# Patient Record
Sex: MALE | Race: WHITE | NOT HISPANIC OR LATINO | Employment: OTHER | URBAN - METROPOLITAN AREA
[De-identification: names, ages, dates, MRNs, and addresses within clinical notes are randomized per-mention and may not be internally consistent; named-entity substitution may affect disease eponyms.]

---

## 2024-04-15 ENCOUNTER — MEDICAL CORRESPONDENCE (OUTPATIENT)
Dept: HEALTH INFORMATION MANAGEMENT | Facility: CLINIC | Age: 54
End: 2024-04-15
Payer: COMMERCIAL

## 2024-04-15 ENCOUNTER — TRANSFERRED RECORDS (OUTPATIENT)
Dept: HEALTH INFORMATION MANAGEMENT | Facility: CLINIC | Age: 54
End: 2024-04-15
Payer: COMMERCIAL

## 2024-04-17 ENCOUNTER — TRANSCRIBE ORDERS (OUTPATIENT)
Dept: OTHER | Age: 54
End: 2024-04-17

## 2024-04-17 DIAGNOSIS — R91.8 LUNG MASS: Primary | ICD-10-CM

## 2024-04-18 ENCOUNTER — PRE VISIT (OUTPATIENT)
Dept: ONCOLOGY | Facility: CLINIC | Age: 54
End: 2024-04-18
Payer: COMMERCIAL

## 2024-04-18 ENCOUNTER — PATIENT OUTREACH (OUTPATIENT)
Dept: ONCOLOGY | Facility: CLINIC | Age: 54
End: 2024-04-18
Payer: COMMERCIAL

## 2024-04-18 DIAGNOSIS — J98.59 MEDIASTINAL MASS: Primary | ICD-10-CM

## 2024-04-18 NOTE — PROGRESS NOTES
New IP (Interventional Pulmonology) referral rec'd.  Chart reviewed.       New Patient: Interventional Pulmonary (Lung nodule) Nurse Navigator Note    Referring provider: Referred by: Dr. Agustin Victoria @ [facility]  Ph: [phone] Fx: [phone]    Referred to (specialty): Interventional Pulmonary (Lung nodule)    Requested provider (if applicable): n/a    Date Referral Received: 4/17/2024    Evaluation for :  Lung nodule    Clinical History (per Nurse review of records provided):    **BOOK MARKED**    Records Location: [facility]     RECORDS NEEDED: 4/15/2024 CT Chest imaging & any other chest imaging (don't see any)---pushed to PACS from [facility]----thank you!!    Additional testing needed prior to consult: PFT's

## 2024-04-18 NOTE — TELEPHONE ENCOUNTER
Imaging DISC Received  2024 4:20 PM ABT   Action: Imaging disc from [facility] received and taken to for upload.     Records Requested     2024 10:36 AM  [place] [address]   Facility  [facility]   Outcome I called the [facility] Dept Phone: [phone]- they recommended I call [facility]     Radiology Dept/[facility] (separate)  Ph: [phone]  Fax: [phone]    Mailing Address:   [facility] [address] [place] [address]    [employer] Tracking Number:   [id number]    They confirmed the pt only had on CT Chest scan done at their facility    1:11pm KEB   I called to make sure they got my request for imaging. They put me on hold to double check on the fax. They haven't received the request yet. I sent over another fax.      RECORDS STATUS - ALL OTHER DIAGNOSIS      RECORDS RECEIVED FROM:    Appt Date:  W   NOTES STATUS DETAILS   [facility]/15[facility]: Dr. Agustin Victoria   MEDICATION LIST [facility]    LABS     PATHOLOGY REPORTS     ANYTHING RELATED TO DIAGNOSIS [facility] Mostrecent 04/15/24   IMAGING (NEED IMAGES & REPORT)     CT SCANS PACS [facility]:  04/15/24: CTA Chest   [employer] TRACKING   TRACKING #: [id number]  2nd [employer] Trackin

## 2024-04-19 PROBLEM — M25.569 KNEE PAIN: Status: ACTIVE | Noted: 2022-05-02

## 2024-04-19 PROBLEM — M17.9 DJD (DEGENERATIVE JOINT DISEASE) OF KNEE: Status: ACTIVE | Noted: 2023-09-17

## 2024-04-19 PROBLEM — R91.8 LUNG MASS: Status: ACTIVE | Noted: 2024-04-15

## 2024-04-19 PROBLEM — M17.12 ARTHRITIS OF LEFT KNEE: Status: ACTIVE | Noted: 2023-09-26

## 2024-04-19 PROBLEM — M17.12 OSTEOARTHRITIS OF LEFT KNEE: Status: ACTIVE | Noted: 2021-05-03

## 2024-04-19 PROBLEM — E66.9 OBESITY: Status: ACTIVE | Noted: 2024-04-19

## 2024-04-19 PROBLEM — R07.9 CHEST PAIN: Status: ACTIVE | Noted: 2024-04-15

## 2024-04-19 PROBLEM — F32.A DEPRESSIVE DISORDER: Status: ACTIVE | Noted: 2024-04-19

## 2024-04-19 PROBLEM — F32.A DEPRESSION: Status: ACTIVE | Noted: 2024-04-19

## 2024-04-19 PROBLEM — M25.512 LEFT SHOULDER PAIN: Status: ACTIVE | Noted: 2022-06-10

## 2024-04-19 PROBLEM — M23.307 DEGENERATIVE TEAR OF MENISCUS OF LEFT KNEE: Status: ACTIVE | Noted: 2023-09-26

## 2024-04-19 PROBLEM — B07.0 PLANTAR WARTS: Status: ACTIVE | Noted: 2021-05-03

## 2024-04-19 PROBLEM — L25.9 CONTACT DERMATITIS: Status: ACTIVE | Noted: 2024-04-15

## 2024-04-19 PROBLEM — I10 ESSENTIAL HYPERTENSION: Status: ACTIVE | Noted: 2021-05-03

## 2024-04-19 PROBLEM — R79.89 ELEVATED D-DIMER: Status: ACTIVE | Noted: 2024-04-15

## 2024-04-19 RX ORDER — CALCIUM CARBONATE 500 MG/1
500 TABLET, CHEWABLE ORAL DAILY
COMMUNITY

## 2024-04-19 RX ORDER — TRIAMCINOLONE ACETONIDE 1 MG/G
CREAM TOPICAL
COMMUNITY
Start: 2024-04-15 | End: 2025-04-15

## 2024-04-19 RX ORDER — AMLODIPINE AND BENAZEPRIL HYDROCHLORIDE 5; 20 MG/1; MG/1
CAPSULE ORAL
COMMUNITY
Start: 2022-06-03

## 2024-04-19 RX ORDER — AMLODIPINE AND BENAZEPRIL HYDROCHLORIDE 5; 40 MG/1; MG/1
CAPSULE ORAL
COMMUNITY
Start: 2023-08-28 | End: 2024-04-26

## 2024-04-19 RX ORDER — AMLODIPINE AND BENAZEPRIL HYDROCHLORIDE 5; 10 MG/1; MG/1
CAPSULE ORAL
COMMUNITY
Start: 2022-06-01 | End: 2024-04-26

## 2024-04-22 NOTE — PROGRESS NOTES
"I called and spoke to Scott at length.  I let him know that Dr. Veloz feels it would be better for him to see a thoracic surgeon based on the location of his \"spot.\" (Anterior mediastinum lesion)    I explained and answered many questions that he had.  I am able to reschedule him from this afternoon to tomorrow afternoon.    I messaged medical records as the CT imaging is not in PACS and they assured me that the disk is due to arrive this morning.    I warm transferred Scott to scheduling.   "

## 2024-04-22 NOTE — PROGRESS NOTES
THORACIC SURGERY - NEW PATIENT OFFICE VISIT      Dear Dr. Victoria,    I saw Scott Bolton at [facility]'s request in consultation for the evaluation and treatment of a nodule of the superior mediastinum.    HPI  Scott Bolton is a 53 year old male patient who presents with a newly discovered superior mediastinal mass. He had chest pain and presented to the emergency department and the workup revealed the mass. He has gained some clint after having his left knee replaced over the winter. His energy levels are good. He has no significant bone pain.   His father  of metastatic head and neck cancer. His brother  of brain cancer. His mother  of possibly lung cancer.     ECOG performance status  0- Fully active, without restriction                 Previsit Tests   CT scan (04/15/2024): superior mediastinal mass 3.2 x 3.1 x 3.0 cm      PMH  Reviewed, as below    Contact dermatitis  Depression  Hypertension  Obesity  Osteoarthritis of left knee      PSH  Reviewed, as below    Left knee replacement  Bilateral inguinal hernia repair as a child  Colonoscopy    No Known Allergies    Current Outpatient Medications   Medication Sig Dispense Refill    amLODIPine-benazepril (LOTREL) 5-10 MG capsule See Instructions, Instructions: Due for OV with PCP.  TAKE ONE CAPSULE BY MOUTH EVERY DAY, # 30 cap(s), 0 Refill(s), Type: Maintenance, Pharmacy: [facility], Due for OV with PCP.  TAKE ONE CAPSULE BY MOUTH EVERY DAY, 71.5, in,...      amLODIPine-benazepril (LOTREL) 5-20 MG capsule 1 cap(s), Oral, daily, # 90 cap(s), 3 Refill(s), Type: Maintenance, Pharmacy: [facility] #[id number], 1 cap(s) Oral daily, 254, lb, 04/15/24 10:38:00 CDT, Weight Measured      amLODIPine-benazepril (LOTREL) 5-40 MG capsule 1 cap(s), Oral, daily, # 90 cap(s), 3 Refill(s), Type: Maintenance, Pharmacy: [facility] #[id number], 1 cap(s) Oral daily,x90 day(s), 239, lb, 23 10:19:00 CDT, Weight Measured      calcium carbonate  "(TUMS) 500 MG chewable tablet Take 500 mg by mouth daily      triamcinolone (KENALOG) 0.1 % external cream 1 aicha, Topical, tid, # 454 gm, 0 Refill(s), Type: Acute, Pharmacy: [facility] #[id number], 1 aicha Topical tid, 254, lb, 04/15/24 10:38:00 CDT, Weight Measured       No current facility-administered medications for this visit.       ETOH: None  TOBACCO:  Never smoker.  Ages 12 - 45 chewing tobacco.  1 tin daily  OTHER DRUGS: Marijuana smoking daily or gummies    Physical examination  BP (!) 144/88   Pulse 85   Temp 98.2  F (36.8  C) (Oral)   Resp 18   Ht 1.792 m (5' 10.57\")   Wt 113.9 kg (251 lb)   SpO2 96%   BMI 35.43 kg/m     Physical Exam  Constitutional:       Appearance: Normal appearance. He is normal weight.   Eyes:      Conjunctiva/sclera: Conjunctivae normal.   Cardiovascular:      Rate and Rhythm: Normal rate.   Pulmonary:      Effort: Pulmonary effort is normal.   Skin:     General: Skin is warm and dry.   Neurological:      Mental Status: He is alert and oriented to person, place, and time.   Psychiatric:         Mood and Affect: Mood normal.         Behavior: Behavior normal.         Thought Content: Thought content normal.         Judgment: Judgment normal.          From a personal perspective, he is here with his wife. 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      IMPRESSION (J98.59) Mediastinal mass     53 year old male patient with a superior mediastinal mass.     PLAN  I spent 30 min on the date of the encounter in chart review, patient visit, review of tests, documentation and/or discussion with other providers about the issues documented above. I reviewed the plan as follows:  Dr. Veloz will schedule Mr. Bolton for an endobronchial ultrasound-guided biopsy of the superior mediastinal mass. Further management will be determined pending the biopsy results.    I appreciate the opportunity to participate in the care of your patient and will keep you updated.  Sincerely,    Gab" MD Alessia

## 2024-04-23 ENCOUNTER — ONCOLOGY VISIT (OUTPATIENT)
Dept: SURGERY | Facility: CLINIC | Age: 54
End: 2024-04-23
Attending: FAMILY MEDICINE
Payer: COMMERCIAL

## 2024-04-23 VITALS
TEMPERATURE: 98.2 F | HEART RATE: 85 BPM | OXYGEN SATURATION: 96 % | HEIGHT: 71 IN | WEIGHT: 251 LBS | DIASTOLIC BLOOD PRESSURE: 88 MMHG | SYSTOLIC BLOOD PRESSURE: 144 MMHG | BODY MASS INDEX: 35.14 KG/M2 | RESPIRATION RATE: 18 BRPM

## 2024-04-23 DIAGNOSIS — J98.59 MEDIASTINAL MASS: ICD-10-CM

## 2024-04-23 PROCEDURE — 99213 OFFICE O/P EST LOW 20 MIN: CPT | Performed by: THORACIC SURGERY (CARDIOTHORACIC VASCULAR SURGERY)

## 2024-04-23 PROCEDURE — 99203 OFFICE O/P NEW LOW 30 MIN: CPT | Performed by: THORACIC SURGERY (CARDIOTHORACIC VASCULAR SURGERY)

## 2024-04-23 ASSESSMENT — PAIN SCALES - GENERAL: PAINLEVEL: NO PAIN (0)

## 2024-04-23 NOTE — NURSING NOTE
"Oncology Rooming Note    April 23, 2024 5:00 PM   Scott Bolton is a 53 year old male who presents for:    Chief Complaint   Patient presents with    Oncology Clinic Visit     Lung Mass     Initial Vitals: BP (!) 144/88   Pulse 85   Temp 98.2  F (36.8  C) (Oral)   Resp 18   Ht 1.792 m (5' 10.57\")   Wt 113.9 kg (251 lb)   SpO2 96%   BMI 35.43 kg/m   Estimated body mass index is 35.43 kg/m  as calculated from the following:    Height as of this encounter: 1.792 m (5' 10.57\").    Weight as of this encounter: 113.9 kg (251 lb). Body surface area is 2.38 meters squared.  No Pain (0) Comment: Data Unavailable   No LMP for male patient.  Allergies reviewed: Yes  Medications reviewed: Yes    Medications: Medication refills not needed today.  Pharmacy name entered into [employer]: [facility] #[id number] - [place] [address] [facility] [address]     Frailty Screening:   Is the patient here for a new oncology consult visit in cancer care? 1. Yes. Over the past month, have you experienced difficulty or required a caregiver to assist with:   1. Balance, walking or general mobility (including any falls)? NO  2. Completion of self-care tasks such as bathing, dressing, toileting, grooming/hygiene?  NO  3. Concentration or memory that affects your daily life?  NO       Clinical concerns:        Graciela Tate CMA              "

## 2024-04-23 NOTE — LETTER
2024         RE: Scott Bolton  [address]  [place] [address]        Dear Colleague,    Thank you for referring your patient, Scott Bolton, to the [facility]. Please see a copy of my visit note below.    THORACIC SURGERY - NEW PATIENT OFFICE VISIT      Dear Dr. Victoria,    I saw Scott Bolton at Magd[facility]'s request in consultation for the evaluation and treatment of a nodule of the superior mediastinum.    HPI  Scott Bolton is a 53 year old male patient who presents with a newly discovered superior mediastinal mass. He had chest pain and presented to the emergency department and the workup revealed the mass. He has gained some clint after having his left knee replaced over the winter. His energy levels are good. He has no significant bone pain.   His father  of metastatic head and neck cancer. His brother  of brain cancer. His mother  of possibly lung cancer.     ECOG performance status  0- Fully active, without restriction                 Previsit Tests   CT scan (04/15/2024): superior mediastinal mass 3.2 x 3.1 x 3.0 cm      PMH  Reviewed, as below    Contact dermatitis  Depression  Hypertension  Obesity  Osteoarthritis of left knee      PSH  Reviewed, as below    Left knee replacement  Bilateral inguinal hernia repair as a child  Colonoscopy    No Known Allergies    Current Outpatient Medications   Medication Sig Dispense Refill    amLODIPine-benazepril (LOTREL) 5-10 MG capsule See Instructions, Instructions: Due for OV with PCP.  TAKE ONE CAPSULE BY MOUTH EVERY DAY, # 30 cap(s), 0 Refill(s), Type: Maintenance, Pharmacy: [facility], Due for OV with PCP.  TAKE ONE CAPSULE BY MOUTH EVERY DAY, 71.5, in,...      amLODIPine-benazepril (LOTREL) 5-20 MG capsule 1 cap(s), Oral, daily, # 90 cap(s), 3 Refill(s), Type: Maintenance, Pharmacy: [facility] #[id number], 1 cap(s) Oral daily, 254, lb, 04/15/24 10:38:00 CDT, Weight Measured       "amLODIPine-benazepril (LOTREL) 5-40 MG capsule 1 cap(s), Oral, daily, # 90 cap(s), 3 Refill(s), Type: Maintenance, Pharmacy: [facility] #[id number], 1 cap(s) Oral daily,x90 day(s), 239, lb, 08/28/23 10:19:00 CDT, Weight Measured      calcium carbonate (TUMS) 500 MG chewable tablet Take 500 mg by mouth daily      triamcinolone (KENALOG) 0.1 % external cream 1 aicha, Topical, tid, # 454 gm, 0 Refill(s), Type: Acute, Pharmacy: [facility] #[id number], 1 aicha Topical tid, 254, lb, 04/15/24 10:38:00 CDT, Weight Measured       No current facility-administered medications for this visit.       ETOH: None  TOBACCO:  Never smoker.  Ages 12 - 45 chewing tobacco.  1 tin daily  OTHER DRUGS: Marijuana smoking daily or gummies    Physical examination  BP (!) 144/88   Pulse 85   Temp 98.2  F (36.8  C) (Oral)   Resp 18   Ht 1.792 m (5' 10.57\")   Wt 113.9 kg (251 lb)   SpO2 96%   BMI 35.43 kg/m     Physical Exam  Constitutional:       Appearance: Normal appearance. He is normal weight.   Eyes:      Conjunctiva/sclera: Conjunctivae normal.   Cardiovascular:      Rate and Rhythm: Normal rate.   Pulmonary:      Effort: Pulmonary effort is normal.   Skin:     General: Skin is warm and dry.   Neurological:      Mental Status: He is alert and oriented to person, place, and time.   Psychiatric:         Mood and Affect: Mood normal.         Behavior: Behavior normal.         Thought Content: Thought content normal.         Judgment: Judgment normal.          From a personal perspective, he is here with his wife. 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      IMPRESSION (J98.59) Mediastinal mass     53 year old male patient with a superior mediastinal mass.     PLAN  I spent 30 min on the date of the encounter in chart review, patient visit, review of tests, documentation and/or discussion with other providers about the issues documented above. I reviewed the plan as follows:  Dr. Veloz will schedule Mr. Bolton for an " endobronchial ultrasound-guided biopsy of the superior mediastinal mass. Further management will be determined pending the biopsy results.    I appreciate the opportunity to participate in the care of your patient and will keep you updated.  Sincerely,    Gab Aggarwal MD

## 2024-04-25 ENCOUNTER — PREP FOR PROCEDURE (OUTPATIENT)
Dept: MULTI SPECIALTY CLINIC | Facility: CLINIC | Age: 54
End: 2024-04-25
Payer: COMMERCIAL

## 2024-04-25 DIAGNOSIS — J98.59 MEDIASTINAL MASS: Primary | ICD-10-CM

## 2024-04-25 RX ORDER — LIDOCAINE 40 MG/G
CREAM TOPICAL
Status: CANCELLED | OUTPATIENT
Start: 2024-04-25

## 2024-04-26 RX ORDER — ASPIRIN 81 MG/1
TABLET, COATED ORAL
COMMUNITY
Start: 2024-01-02

## 2024-05-02 ENCOUNTER — PATIENT OUTREACH (OUTPATIENT)
Dept: ONCOLOGY | Facility: CLINIC | Age: 54
End: 2024-05-02
Payer: COMMERCIAL

## 2024-05-02 NOTE — PROGRESS NOTES
Scott called today.  I had spoken to him a couple weeks ago when I rec'd an IP (Interventional Pulmonology) referral for him.  At that time I had Dr. Veloz (on-call for IP (Interventional Pulmonology) that day) review the chart/image to see if this is something IP would see and he lida to have Thoracic surgery see patient as it was an anterior mediastinal mass.    4/23/2024:  Pt saw Dr. Aggarwal who in the end referred him back to Dr. Veloz.  Per Dr. Aggarwal's note:  Dr. Veloz will schedule Mr. Bolton for an endobronchial ultrasound-guided biopsy of the superior mediastinal mass. Further management will be determined pending the biopsy results.     I checked and DR. Veloz has placed a CR but we are waiting for an OR time to become available.  I called and left Scott this information and Mark Anthony's phone number ().    Scott has my number for any further questions.

## 2024-05-06 ENCOUNTER — TELEPHONE (OUTPATIENT)
Dept: PULMONOLOGY | Facility: CLINIC | Age: 54
End: 2024-05-06
Payer: COMMERCIAL

## 2024-05-06 ENCOUNTER — CARE COORDINATION (OUTPATIENT)
Dept: PULMONOLOGY | Facility: CLINIC | Age: 54
End: 2024-05-06
Payer: COMMERCIAL

## 2024-05-06 NOTE — TELEPHONE ENCOUNTER
Writer contacted patient to schedule GI procedure. Scheduled 05/13 with Dr. Veloz. Packet information sent via email provided by patient [email or web address].    Mark Anthony Valdivia on 5/6/2024 at 10:40 AM

## 2024-05-06 NOTE — PROGRESS NOTES
Preoperative instructions sent via secure email to patient's verified email in chart ([email or web address]) and via [employer] mail per patient's request as they do not have [employer] access.     The following is what was sent via email/mail:    Sumanth Chavez,    Below are the following instructions you will need for your upcoming procedure. Read through these and let me know if you have any further questions.     Please hold the following medication(s) prior to your procedure:  Aspirin  -     Your Surgery Day   Your surgery is on: May 13th, 2024  ARRIVAL TIME: 10:00 am  Surgery Time: 11:00 am      Surgery Location:      [facility]      Address:      [address] [place] [address]      Day of surgery: [phone]      Other questions: [phone]      Fax test results to: [phone]    Check-in:   You may use our "[employer]" parking at the main entrance  Check in at the welcome desk in the main lobby and you will be directed to where you need to go from there.    Important phone numbers:  [phone]    Please remember that surgery times are subject to change. You will be notified if your surgery time changes.      Preparing for surgery    Call your clinic if there's any change in your health. This includes signs of a cold or flu (sore throat, runny nose, cough, rash, fever). It also includes a scrape or scratch near the surgery site. If you have questions on the day of surgery, call your hospital or surgery center.     Eating and drinking guidelines  For your safety: Unless your surgeon tells you otherwise, follow the guidelines below.  Eat and drink as usual until 8 hours before you arrive for surgery. After that, no food or milk.  Drink clear liquids until 2 hours before you arrive. These are liquids you can see through, like water, Gatorade, and Propel Water. They also include plain black coffee and tea (no cream  or milk), candy, and breath mints. You can spit out gum when you arrive.  If you drink alcohol: Stop drinking it the night before surgery.  If your care team tells you to take medicine on the morning of surgery, it's okay to take it with a sip of water.    Preventing infection  Shower or bathe the morning of your surgery.   Don't smoke or vape the morning of surgery. You may chew nicotine gum up to 2 hours before surgery. A nicotine patch is okay.  Note: Some surgeries require you to completely quit smoking and nicotine. Check with your surgeon.    Your care team will make every effort to keep you safe from infection. We will:  Clean our hands often with soap and water (or an alcohol-based hand rub).  Clean the skin at your surgery site with a special soap that kills germs.  Give you a special gown to keep you warm. (Cold raises the risk of infection.)  Wear special hair covers, masks, gowns, and gloves during surgery.     What to bring on the day of surgery  Photo ID and insurance card  Glasses and hearing aids (bring cases) you can't wear contacts during surgery    Please remove any jewelry, including body piercings. Leave jewelry and other valuables at home.     If you're going home the day of surgery  You must have a responsible adult drive you home. They should stay with you overnight as well.  If you don't have someone to stay with you, and you aren't safe to go home alone, we may keep you overnight. Insurance often won't pay for this.

## 2024-05-13 ENCOUNTER — HOSPITAL ENCOUNTER (OUTPATIENT)
Facility: CLINIC | Age: 54
Discharge: HOME | End: 2024-05-13
Attending: INTERNAL MEDICINE | Admitting: INTERNAL MEDICINE
Payer: COMMERCIAL

## 2024-05-13 VITALS
SYSTOLIC BLOOD PRESSURE: 164 MMHG | HEART RATE: 111 BPM | RESPIRATION RATE: 16 BRPM | OXYGEN SATURATION: 5 % | DIASTOLIC BLOOD PRESSURE: 109 MMHG

## 2024-05-13 PROCEDURE — 88342 IMHCHEM/IMCYTCHM 1ST ANTB: CPT | Mod: 26 | Performed by: PATHOLOGY

## 2024-05-13 PROCEDURE — 88305 TISSUE EXAM BY PATHOLOGIST: CPT | Mod: 26 | Performed by: PATHOLOGY

## 2024-05-13 PROCEDURE — 99153 MOD SED SAME PHYS/QHP EA: CPT | Performed by: INTERNAL MEDICINE

## 2024-05-13 PROCEDURE — 31652 BRONCH EBUS SAMPLNG 1/2 NODE: CPT | Performed by: INTERNAL MEDICINE

## 2024-05-13 PROCEDURE — 88173 CYTOPATH EVAL FNA REPORT: CPT | Mod: 26 | Performed by: PATHOLOGY

## 2024-05-13 PROCEDURE — 88342 IMHCHEM/IMCYTCHM 1ST ANTB: CPT | Mod: TC | Performed by: INTERNAL MEDICINE

## 2024-05-13 PROCEDURE — 250N000009 HC RX 250: Performed by: INTERNAL MEDICINE

## 2024-05-13 PROCEDURE — 250N000011 HC RX IP 250 OP 636: Performed by: INTERNAL MEDICINE

## 2024-05-13 PROCEDURE — 99152 MOD SED SAME PHYS/QHP 5/>YRS: CPT | Performed by: INTERNAL MEDICINE

## 2024-05-13 PROCEDURE — 88172 CYTP DX EVAL FNA 1ST EA SITE: CPT | Mod: 26 | Performed by: PATHOLOGY

## 2024-05-13 PROCEDURE — 88341 IMHCHEM/IMCYTCHM EA ADD ANTB: CPT | Mod: 26 | Performed by: PATHOLOGY

## 2024-05-13 PROCEDURE — G0500 MOD SEDAT ENDO SERVICE >5YRS: HCPCS | Performed by: INTERNAL MEDICINE

## 2024-05-13 RX ORDER — LIDOCAINE HYDROCHLORIDE 40 MG/ML
INJECTION, SOLUTION RETROBULBAR PRN
Status: DISCONTINUED | OUTPATIENT
Start: 2024-05-13 | End: 2024-05-13 | Stop reason: HOSPADM

## 2024-05-13 RX ORDER — FENTANYL CITRATE 50 UG/ML
INJECTION, SOLUTION INTRAMUSCULAR; INTRAVENOUS PRN
Status: DISCONTINUED | OUTPATIENT
Start: 2024-05-13 | End: 2024-05-13 | Stop reason: HOSPADM

## 2024-05-13 RX ORDER — DIPHENHYDRAMINE HYDROCHLORIDE 50 MG/ML
INJECTION INTRAMUSCULAR; INTRAVENOUS PRN
Status: DISCONTINUED | OUTPATIENT
Start: 2024-05-13 | End: 2024-05-13 | Stop reason: HOSPADM

## 2024-05-13 RX ORDER — LIDOCAINE HYDROCHLORIDE 10 MG/ML
INJECTION, SOLUTION INFILTRATION; PERINEURAL PRN
Status: DISCONTINUED | OUTPATIENT
Start: 2024-05-13 | End: 2024-05-13 | Stop reason: HOSPADM

## 2024-05-13 RX ORDER — LIDOCAINE 40 MG/G
CREAM TOPICAL
Status: DISCONTINUED | OUTPATIENT
Start: 2024-05-13 | End: 2024-05-13 | Stop reason: HOSPADM

## 2024-05-13 ASSESSMENT — ACTIVITIES OF DAILY LIVING (ADL)
ADLS_ACUITY_SCORE: 35

## 2024-05-13 NOTE — BRIEF OP NOTE
[facility]    Brief Operative Note    Pre-operative diagnosis: Mediastinal mass [J98.59]  Post-operative diagnosis Same as pre-operative diagnosis    Procedure: BRONCHOSCOPY, WITH BIOPSY OF 1 OR 2 LYMPH NODE STATIONS WITH ENDOBRONCHIAL ULTRASOUND GUIDANCE, N/A - Bronchus    Surgeon: Surgeons and Role:     * Esa Veloz MD - Primary  Anesthesia: Moderate Sedation   Estimated Blood Loss: None    Drains: None  Specimens:   ID Type Source Tests Collected by Time Destination   1 : anterior mediastinal mass Fine Needle Aspiration Lung, Left FINE NEEDLE ASPIRATE Esa Veloz MD 5/13/2024 12:18 PM      Findings:   Mass identified .  Complications: None.  Implants: * No implants in log *

## 2024-05-13 NOTE — OR NURSING
Procedure: EBUS  Sedation: conscious sedation  Specimens: verified, sent to lab with path staff  O2: ra  Tolerated procedure: well  Pt to recovery area in stable condition accompanied by RN, bedside report given to recovery RN  Other:  n/a    All belongings with patient at time of transfer.

## 2024-05-13 NOTE — DISCHARGE INSTRUCTIONS
Post Bronchoscopy Patient Instructions:    May 13, 2024  Scott Bolton    Your procedure was completed (bronchoscopy with biopsy) without any immediate complications.    You may cough up scant amount of blood for the next 12-24 hours. If you have excessive cough with blood, chest pain, shortness of breath, please report to the closest emergency room.    You may experience low grade (less than 100.5 F) fever next 24 hours, if so, you can take Tylenol. If the fever persists more than 24 hours, please contact to our office or your primary care provider.    Our office (Thoracic/Pulmonary--[phone]) will call you with the results of any samples taken during the procedure. Please note that you may get a result notification through  My Chart  before us calling you as the Laboratories are instructed to release the results as soon as they are available to the patients and providers at the same time. Please allow your provider 24 hours call you to discuss the results.    You may resume your diet as it was prior to procedure.    You may resume your medications after the procedure unless you are instructed to do differently.     Please follow instructions from the nursing staff upon discharge in terms of activity. In general, you should avoid any attention or motor skill requiring activities (e.g., driving or operating any motorized vehicle) for 24 hours as you might be still under the effect of sedation medications. Please make sure an adult to accompany you next 24 hours.     Should you have any question, please do not hesitate to call our office.    Esa Veloz MD

## 2024-05-15 ENCOUNTER — CARE COORDINATION (OUTPATIENT)
Dept: PULMONOLOGY | Facility: CLINIC | Age: 54
End: 2024-05-15
Payer: COMMERCIAL

## 2024-05-15 LAB
PATH REPORT.COMMENTS IMP SPEC: ABNORMAL
PATH REPORT.COMMENTS IMP SPEC: YES
PATH REPORT.FINAL DX SPEC: ABNORMAL
PATH REPORT.GROSS SPEC: ABNORMAL
PATH REPORT.MICROSCOPIC SPEC OTHER STN: ABNORMAL
PATH REPORT.RELEVANT HX SPEC: ABNORMAL

## 2024-05-15 NOTE — PROGRESS NOTES
FNA results forwarded to Dr. Veloz and Dr. Aggarwal.     Fine Needle Aspirate Lung, Left: [id number]  Order: [id number]  Status: Final result       Visible to patient: No (inaccessible in MyChart)    1 Result Note      Component  Ref Range & Units    Final Diagnosis   Specimen A                 Interpretation:                  Benign thyroid parenchyma noted (see comment)  No obvious evidence of malignancy                 Adequacy:                 Satisfactory for evaluation but limited by:, Scant cellularity

## 2024-05-15 NOTE — PROCEDURES
INTERVENTIONAL PULMONOLOGY       Procedure(s):    A flexible bronchoscopy  Airway exam  EBUS-TBNA (1 sites)    Indication:  anterior mediastinal mass      Attending of Record:  Esa Veloz MD    Interventional Pulmonary Fellow   Kendall Mason MD     Trainees Present:   None     Medications:    3.5 mg versed  225 mcg fentanyl  50 mg benadryl    Sedation Time:   Total sedation time was Choose Duration: 22 minutes of continuous bedside 1:1 monitoring.    Time Out:  Performed    The patient's medical record has been reviewed.  The indication for the procedure was reviewed.  The necessary history and physical examination was performed and reviewed.  The risks, benefits and alternatives of the procedure were discussed with the the patient in detail and he had the opportunity to ask questions.  I discussed in particular the potential complications including risks of minor or life-threatening bleeding and/or infection, respiratory failure, vocal cord trauma / paralysis, pneumothorax, and discomfort. Sedation risks were also discussed including abnormal heart rhythms, low blood pressure, and respiratory failure. All questions were answered to the best of my ability.  Verbal and written informed consent was obtained.  The proposed procedure and the patient's identification were verified prior to the procedure by the physician and the nurse, respiratory therapist.    The patient was assessed for the adequacy for the procedure and to receive medications.   Mental Status:  Alert and oriented x 3  Airway examination:  Class II (Complete visualization of uvula)  Pulmonary:  Clear to ausculation bilaterally  CV:  RRR, no murmurs or gallops  ASA Grade:  (II)  Mild systemic disease    After clinical evaluation and reviewing the indication, risks, alternatives and benefits of the procedure the patient was deemed to be in satisfactory condition to undergo the procedure.      Immediately before administration of medications  the patient was re-assessed for adequacy to receive sedatives including the heart rate, respiratory rate, mental status, oxygen saturation, blood pressure and adequacy of pulmonary ventilation. These same parameters were continuously monitored throughout the procedure.    A Tuberculosis risk assessment was performed:  The patient has no known RISK of Tuberculosis    The procedure was performed in a negative airflow room: Yes    Maneuvers / Procedure:      A Flexible  bronchoscope was used for the procedure. The flexible bronchoscope was inserted through the mouth observing the epiglottis and posterior pharyngeal structures. The VC were anesthetized with 1% and 4% lidocaine. The scope was then advanced throught the cords and into the trachea.      Airway Examination: A complete airway examination was performed from the distal trachea to the subsegmental level in each lobe of both lungs.  Pertinent findings include normal airways.         EBUS-TBNA (Lymph Node) The EBUS scope was inserted and evaluation included:    Anterior mediastinal mass identified with some difficulty due to high position.  It was soft tissue density with some areas of fluid density interspersed.  4 passed done into different portions of the mass.  At this point coughing made further biopsies difficult to complete.                                                                                                                                                                                                                                                                           Relevant Pictures      Recommendations:     -->  follow up with thoracic surgery.    Esa Veloz MD

## 2024-05-19 ENCOUNTER — HEALTH MAINTENANCE LETTER (OUTPATIENT)
Age: 54
End: 2024-05-19

## 2024-05-22 ENCOUNTER — TELEPHONE (OUTPATIENT)
Dept: OTOLARYNGOLOGY | Facility: CLINIC | Age: 54
End: 2024-05-22
Payer: COMMERCIAL

## 2024-05-22 ENCOUNTER — PATIENT OUTREACH (OUTPATIENT)
Dept: SURGERY | Facility: CLINIC | Age: 54
End: 2024-05-22
Payer: COMMERCIAL

## 2024-05-22 NOTE — PROGRESS NOTES
Spoke with Scott to give him an update after talking with Dr. Aggarwal. Dr. Aggarwal would like patient to see Dr. Templeton with Head and Neck Surgery. Patient's anterior mediastinal mass was benign thyroid tissue. Scott was relieved to find out it was not cancerous. He isn't interested in having surgery but is open to having a consult with Dr. Templeton. He appreciated the call and had no further questions or concerns.

## 2024-05-23 NOTE — TELEPHONE ENCOUNTER
FUTURE VISIT INFORMATION      FUTURE VISIT INFORMATION:  Date: 7/24/24  Time: 2:20 PM  Location[place] [facility] [place] [facility] [place]  REFERRAL INFORMATION:  Referring provider:    Referring providers clinic:    Reason for visit/diagnosis:  Anterior mediastinal mass with thyroid tissue on biopsy referred by Dr. Gab deleon okay per Dr. Templeton     RECORDS REQUESTED FROM      Clinic name Comments Records Status Imaging Status   [facility]  4/23/24 note- Gab Aggarwal MD     Procedure:  5/13/24 BRONCHOSCOPY, WITH BIOPSY OF 1 OR 2 LYMPH NODE STATIONS WITH ENDOBRONCHIAL ULTRASOUND GUIDANCE  [facility] 4/15/24 CT chest pulm Scanned in [facility] PACS   [facility] 5/13/24 Fine Needle Aspirate Lung, Left: [id number]  Epic

## 2024-07-23 NOTE — PROGRESS NOTES
Dear Dr. Aggarwal:    I had the pleasure of meeting Scott Bolton in consultation today at the [facility] at your request.     History of Present Illness:   Scott Bolton is a 54 year old man referred for evaluation of a thyroid nodule. He was seen by thoracic surgery on 2024 for evaluation of a superior mediastinal mass found incidentally on workup of chest pain. CT chest was performed on 4/15/2024 which demonstrated a 3.2 x 3.1 x 3.0 cm superior mediastinal mass. He was referred to Dr Veloz for EBUS. Dr Veloz performed EBUS on 2024, with cytology showing benign thyroid, without evidence of malignancy. He was referred here for further evaluation.    He was unaware of the mass prior to the scan. He has no symptoms from it. He has no swallowing issues. He has no voice changes. He has no compression changes. He has no problems with his breathing.    He has no history of thyroid problems.      Past medical history: hypertension    Past surgical history: knee replacement, bilateral inguinal hernia repair    Social history: Nonsmoker. Former chewing tobacco use - 1 tin/day, age [age]. Smoke pot. No alcohol for 25 years. 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     Family history: Father  of metastatic H&N cancer. Brother  of brain cancer. Mother  of lung cancer.     MEDICATIONS:     Current Outpatient Medications   Medication Sig Dispense Refill    amLODIPine-benazepril (LOTREL) 5-20 MG capsule 1 cap(s), Oral, daily, # 90 cap(s), 3 Refill(s), Type: Maintenance, Pharmacy: [facility] #[id number], 1 cap(s) Oral daily, 254, lb, 04/15/24 10:38:00 CDT, Weight Measured      ASPIRIN LOW DOSE 81 MG EC tablet TAKE 1 TABLET BY MOUTH TWICE DAILY FOR 42 DAYS      calcium carbonate (TUMS) 500 MG chewable tablet Take 500 mg by mouth daily      triamcinolone (KENALOG) 0.1 % external cream 1 aicha, Topical, tid, # 454 gm, 0 Refill(s), Type: Acute, Pharmacy: [facility] #[id number], 1 aicha  Topical tid, 254, lb, 04/15/24 10:38:00 CDT, Weight Measured         ALLERGIES:  No Known Allergies    HABITS/SOCIAL HISTORY:   Nonsmoker. Former chewing tobacco use - 1 tin/day, age [age]. Smoke pot. No alcohol for 25 years.   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     Social History     Socioeconomic History    Marital status:      Spouse name: Not on file    Number of children: Not on file    Years of education: Not on file    Highest education level: Not on file   Occupational History    Not on file   Tobacco Use    Smoking status: Never     Passive exposure: Past    Smokeless tobacco: Former   Substance and Sexual Activity    Alcohol use: Not on file    Drug use: Yes     Types: Marijuana    Sexual activity: Not on file   Other Topics Concern    Not on file   Social History Narrative    Not on file     Social Determinants of Health     Financial Resource Strain: Not on file   Food Insecurity: Not on file   Transportation Needs: Not on file   Physical Activity: Not on file   Stress: Not on file   Social Connections: Not on file   Interpersonal Safety: Not on file   Housing Stability: Not on file       PAST MEDICAL HISTORY: No past medical history on file.     PAST SURGICAL HISTORY:   Past Surgical History:   Procedure Laterality Date    BRONCHOSCOPY RIDID OR FLEXIBLE W/ENDOBRONCHIAL ULTRASOUND GUIDED 1 OR 2 NODE STATIONS N/A 5/13/2024    Procedure: BRONCHOSCOPY, WITH BIOPSY OF 1 OR 2 LYMPH NODE STATIONS WITH ENDOBRONCHIAL ULTRASOUND GUIDANCE;  Surgeon: Esa Veloz MD;  Location: [facility]       FAMILY HISTORY:  No family history on file.    REVIEW OF SYSTEMS:  12 point ROS was negative other than the symptoms noted above in the HPI.  Patient Supplied Answers to Review of Systems       No data to display                  PHYSICAL EXAMINATION:   There were no vitals taken for this visit.  Patient in NAD  Breathing comfortably on room air  No hoarseness    PROCEDURES:     RESULTS REVIEWED:   I reviewed note from thoracic  surgery, path report, CT imaging    I independently reviewed CT chest imaging      IMPRESSION AND PLAN:   Scott Bolton is a 54 year old man with an incidental thyroid nodule identified on chest CT. He had an EBUS with Dr Veloz which showed benign thyroid tissue. He has no symptoms from the mass. He is comfortable with the plan for observation which we discussed today. We will get follow-up CT in about 6 months. If stable can re-scan in a year. He would like to do scans in [place].     Thank you very much for the opportunity to participate in the care of your patient.      Josefina Templeton MD  Otolaryngology- Head & Neck Surgery      This note was dictated with voice recognition software and then edited. Please excuse any unintentional errors.       Video start:2:08 pm  Video end: 2:15    Physician location: clinic    Platform used: [facility]      CC:  Gab Aggarwal MD  Department of Thoracic Surgery  [facility]

## 2024-07-24 ENCOUNTER — PRE VISIT (OUTPATIENT)
Dept: OTOLARYNGOLOGY | Facility: CLINIC | Age: 54
End: 2024-07-24

## 2024-07-24 ENCOUNTER — VIRTUAL VISIT (OUTPATIENT)
Dept: OTOLARYNGOLOGY | Facility: CLINIC | Age: 54
End: 2024-07-24
Payer: COMMERCIAL

## 2024-07-24 DIAGNOSIS — E04.1 THYROID NODULE: Primary | ICD-10-CM

## 2024-07-24 PROCEDURE — 99203 OFFICE O/P NEW LOW 30 MIN: CPT | Mod: 95 | Performed by: OTOLARYNGOLOGY

## 2024-07-24 NOTE — LETTER
2024       RE: Scott Bolton  [address]  [place] [address]     Dear Colleague,    Thank you for referring your patient, Scott Bolton, to the [facility] [place] at [facility]. Please see a copy of my visit note below.    Dear Dr. Aggarwal:    I had the pleasure of meeting Scott Bolton in consultation today at the [facility] at your request.     History of Present Illness:   Scott Bolton is a 54 year old man referred for evaluation of a thyroid nodule. He was seen by thoracic surgery on 2024 for evaluation of a superior mediastinal mass found incidentally on workup of chest pain. CT chest was performed on 4/15/2024 which demonstrated a 3.2 x 3.1 x 3.0 cm superior mediastinal mass. He was referred to Dr Veloz for EBUS. Dr Veloz performed EBUS on 2024, with cytology showing benign thyroid, without evidence of malignancy. He was referred here for further evaluation.    He was unaware of the mass prior to the scan. He has no symptoms from it. He has no swallowing issues. He has no voice changes. He has no compression changes. He has no problems with his breathing.    He has no history of thyroid problems.      Past medical history: hypertension    Past surgical history: knee replacement, bilateral inguinal hernia repair    Social history: Nonsmoker. Former chewing tobacco use - 1 tin/day, age [age]. Smoke pot. No alcohol for 25 years. 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     Family history: Father  of metastatic H&N cancer. Brother  of brain cancer. Mother  of lung cancer.     MEDICATIONS:     Current Outpatient Medications   Medication Sig Dispense Refill     amLODIPine-benazepril (LOTREL) 5-20 MG capsule 1 cap(s), Oral, daily, # 90 cap(s), 3 Refill(s), Type: Maintenance, Pharmacy: [facility] #[id number], 1 cap(s) Oral daily, 254, lb, 04/15/24 10:38:00 CDT, Weight  Measured       ASPIRIN LOW DOSE 81 MG EC tablet TAKE 1 TABLET BY MOUTH TWICE DAILY FOR 42 DAYS       calcium carbonate (TUMS) 500 MG chewable tablet Take 500 mg by mouth daily       triamcinolone (KENALOG) 0.1 % external cream 1 aicha, Topical, tid, # 454 gm, 0 Refill(s), Type: Acute, Pharmacy: [facility] #[id number], 1 aicha Topical tid, 254, lb, 04/15/24 10:38:00 CDT, Weight Measured         ALLERGIES:  No Known Allergies    HABITS/SOCIAL HISTORY:   Nonsmoker. Former chewing tobacco use - 1 tin/day, age [age]. Smoke pot. No alcohol for 25 years.   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     Social History     Socioeconomic History     Marital status:      Spouse name: Not on file     Number of children: Not on file     Years of education: Not on file     Highest education level: Not on file   Occupational History     Not on file   Tobacco Use     Smoking status: Never     Passive exposure: Past     Smokeless tobacco: Former   Substance and Sexual Activity     Alcohol use: Not on file     Drug use: Yes     Types: Marijuana     Sexual activity: Not on file   Other Topics Concern     Not on file   Social History Narrative     Not on file     Social Determinants of Health     Financial Resource Strain: Not on file   Food Insecurity: Not on file   Transportation Needs: Not on file   Physical Activity: Not on file   Stress: Not on file   Social Connections: Not on file   Interpersonal Safety: Not on file   Housing Stability: Not on file       PAST MEDICAL HISTORY: No past medical history on file.     PAST SURGICAL HISTORY:   Past Surgical History:   Procedure Laterality Date     BRONCHOSCOPY RIDID OR FLEXIBLE W/ENDOBRONCHIAL ULTRASOUND GUIDED 1 OR 2 NODE STATIONS N/A 5/13/2024    Procedure: BRONCHOSCOPY, WITH BIOPSY OF 1 OR 2 LYMPH NODE STATIONS WITH ENDOBRONCHIAL ULTRASOUND GUIDANCE;  Surgeon: Esa Veloz MD;  Location: [facility]       FAMILY HISTORY:  No family history on file.    REVIEW OF SYSTEMS:  12 point ROS was negative  other than the symptoms noted above in the HPI.  Patient Supplied Answers to Review of Systems       No data to display                  PHYSICAL EXAMINATION:   There were no vitals taken for this visit.  Patient in NAD  Breathing comfortably on room air  No hoarseness    PROCEDURES:     RESULTS REVIEWED:   I reviewed note from thoracic surgery, path report, CT imaging    I independently reviewed CT chest imaging      IMPRESSION AND PLAN:   Scott Bolton is a 54 year old man with an incidental thyroid nodule identified on chest CT. He had an EBUS with Dr Veloz which showed benign thyroid tissue. He has no symptoms from the mass. He is comfortable with the plan for observation which we discussed today. We will get follow-up CT in about 6 months. If stable can re-scan in a year. He would like to do scans in [place].     Thank you very much for the opportunity to participate in the care of your patient.      Josefina Templeton MD  Otolaryngology- Head & Neck Surgery      This note was dictated with voice recognition software and then edited. Please excuse any unintentional errors.       Video start:2:08 pm  Video end: 2:15    Physician location: clinic    Platform used: [facility]      CC:  Gab Aggarwal MD  Department of Thoracic Surgery  [facility]      Again, thank you for allowing me to participate in the care of your patient.      Sincerely,    Josefina Templeton MD

## 2025-06-08 ENCOUNTER — HEALTH MAINTENANCE LETTER (OUTPATIENT)
Age: 55
End: 2025-06-08

## (undated) RX ORDER — DIPHENHYDRAMINE HYDROCHLORIDE 50 MG/ML
INJECTION INTRAMUSCULAR; INTRAVENOUS
Status: DISPENSED
Start: 2024-05-13

## (undated) RX ORDER — LIDOCAINE HYDROCHLORIDE 10 MG/ML
INJECTION, SOLUTION EPIDURAL; INFILTRATION; INTRACAUDAL; PERINEURAL
Status: DISPENSED
Start: 2024-05-13

## (undated) RX ORDER — FENTANYL CITRATE 50 UG/ML
INJECTION, SOLUTION INTRAMUSCULAR; INTRAVENOUS
Status: DISPENSED
Start: 2024-05-13